# Patient Record
Sex: MALE | Race: WHITE | NOT HISPANIC OR LATINO
[De-identification: names, ages, dates, MRNs, and addresses within clinical notes are randomized per-mention and may not be internally consistent; named-entity substitution may affect disease eponyms.]

---
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
## 2020-08-18 ENCOUNTER — APPOINTMENT (OUTPATIENT)
Dept: PEDIATRICS | Facility: CLINIC | Age: 20
End: 2020-08-18
Payer: COMMERCIAL

## 2020-08-18 VITALS
BODY MASS INDEX: 24.36 KG/M2 | HEART RATE: 75 BPM | OXYGEN SATURATION: 98 % | WEIGHT: 174 LBS | SYSTOLIC BLOOD PRESSURE: 126 MMHG | DIASTOLIC BLOOD PRESSURE: 80 MMHG | HEIGHT: 71 IN

## 2020-08-18 DIAGNOSIS — E55.9 VITAMIN D DEFICIENCY, UNSPECIFIED: ICD-10-CM

## 2020-08-18 PROCEDURE — 96127 BRIEF EMOTIONAL/BEHAV ASSMT: CPT

## 2020-08-18 PROCEDURE — 90471 IMMUNIZATION ADMIN: CPT

## 2020-08-18 PROCEDURE — 90651 9VHPV VACCINE 2/3 DOSE IM: CPT

## 2020-08-18 PROCEDURE — 96160 PT-FOCUSED HLTH RISK ASSMT: CPT | Mod: 59

## 2020-08-18 PROCEDURE — 90621 MENB-FHBP VACC 2/3 DOSE IM: CPT

## 2020-08-18 PROCEDURE — 99395 PREV VISIT EST AGE 18-39: CPT | Mod: 25

## 2020-08-18 PROCEDURE — 90472 IMMUNIZATION ADMIN EACH ADD: CPT

## 2020-08-18 NOTE — PHYSICAL EXAM
[Alert] : alert [No Acute Distress] : no acute distress [Normocephalic] : normocephalic [EOMI Bilateral] : EOMI bilateral [Clear tympanic membranes with bony landmarks and light reflex present bilaterally] : clear tympanic membranes with bony landmarks and light reflex present bilaterally  [Nonerythematous Oropharynx] : nonerythematous oropharynx [Pink Nasal Mucosa] : pink nasal mucosa [Supple, full passive range of motion] : supple, full passive range of motion [No Palpable Masses] : no palpable masses [Clear to Auscultation Bilaterally] : clear to auscultation bilaterally [Regular Rate and Rhythm] : regular rate and rhythm [No Murmurs] : no murmurs [Normal S1, S2 audible] : normal S1, S2 audible [+2 Femoral Pulses] : +2 femoral pulses [Soft] : soft [NonTender] : non tender [Non Distended] : non distended [Normoactive Bowel Sounds] : normoactive bowel sounds [No Splenomegaly] : no splenomegaly [No Hepatomegaly] : no hepatomegaly [No Abnormal Lymph Nodes Palpated] : no abnormal lymph nodes palpated [Normal Muscle Tone] : normal muscle tone [No pain or deformities with palpation of bone, muscles, joints] : no pain or deformities with palpation of bone, muscles, joints [No Gait Asymmetry] : no gait asymmetry [Straight] : straight [+2 Patella DTR] : +2 patella DTR [Cranial Nerves Grossly Intact] : cranial nerves grossly intact [No Rash or Lesions] : no rash or lesions

## 2020-08-18 NOTE — HISTORY OF PRESENT ILLNESS
[Needs Immunizations] : needs immunizations [Eats meals with family] : eats meals with family [Has family members/adults to turn to for help] : has family members/adults to turn to for help [Is permitted and is able to make independent decisions] : Is permitted and is able to make independent decisions [Grade: ____] : Grade: [unfilled] [Normal Behavior/Attention] : normal behavior/attention [Normal Performance] : normal performance [Normal Homework] : normal homework [Eats regular meals including adequate fruits and vegetables] : eats regular meals including adequate fruits and vegetables [Calcium source] : calcium source [Drinks non-sweetened liquids] : drinks non-sweetened liquids  [Has friends] : has friends [At least 1 hour of physical activity a day] : at least 1 hour of physical activity a day [Has interests/participates in community activities/volunteers] : has interests/participates in community activities/volunteers. [Drinks alcohol] : drinks alcohol [No] : No cigarette smoke exposure [Uses safety belts/safety equipment] : uses safety belts/safety equipment  [Has peer relationships free of violence] : has peer relationships free of violence [Yes] : Patient has had sexual intercourse. [Always] : Condom use: always [Gender Identification: _______] : [unfilled] [Sexual Orientation: _______] : [unfilled] [HIV Screening Declined] : HIV Screening Declined [Has ways to cope with stress] : has ways to cope with stress [Displays self-confidence] : displays self-confidence [With Teen] : teen [Sleep Concerns] : no sleep concerns [Has concerns about body or appearance] : does not have concerns about body or appearance [Screen time (except homework) less than 2 hours a day] : no screen time (except homework) less than 2 hours a day [Exposure to electronic nicotine delivery system] : no exposure to electronic nicotine delivery system [Uses electronic nicotine delivery system] : does not use electronic nicotine delivery system [Uses tobacco] : does not use tobacco [Exposure to tobacco] : no exposure to tobacco [Exposure to drugs] : no exposure to drugs [Uses drugs] : does not use drugs  [Impaired/distracted driving] : no impaired/distracted driving [Exposure to alcohol] : no exposure to alcohol [History of STI] : no history of STI [Has problems with sleep] : does not have problems with sleep [Gets depressed, anxious, or irritable/has mood swings] : does not get depressed, anxious, or irritable/has mood swings [Has thought about hurting self or considered suicide] : has not thought about hurting self or considered suicide [FreeTextEntry7] : No hospitalization/Surgeries, Specialist visit. [de-identified] : None [de-identified] : HPV#3/Men B#2

## 2021-01-19 ENCOUNTER — APPOINTMENT (OUTPATIENT)
Dept: PEDIATRICS | Facility: CLINIC | Age: 21
End: 2021-01-19
Payer: COMMERCIAL

## 2021-01-19 VITALS
HEART RATE: 64 BPM | SYSTOLIC BLOOD PRESSURE: 145 MMHG | BODY MASS INDEX: 25.2 KG/M2 | HEIGHT: 71 IN | TEMPERATURE: 98.4 F | WEIGHT: 180 LBS | RESPIRATION RATE: 18 BRPM | DIASTOLIC BLOOD PRESSURE: 85 MMHG

## 2021-01-19 PROCEDURE — 99214 OFFICE O/P EST MOD 30 MIN: CPT

## 2021-01-19 PROCEDURE — 99072 ADDL SUPL MATRL&STAF TM PHE: CPT

## 2021-08-01 DIAGNOSIS — Z01.818 ENCOUNTER FOR OTHER PREPROCEDURAL EXAMINATION: ICD-10-CM

## 2021-08-01 DIAGNOSIS — Z23 ENCOUNTER FOR IMMUNIZATION: ICD-10-CM

## 2021-08-01 DIAGNOSIS — Z00.129 ENCOUNTER FOR ROUTINE CHILD HEALTH EXAMINATION W/OUT ABNORMAL FINDINGS: ICD-10-CM

## 2021-08-02 ENCOUNTER — APPOINTMENT (OUTPATIENT)
Dept: PEDIATRICS | Facility: CLINIC | Age: 21
End: 2021-08-02
Payer: COMMERCIAL

## 2021-08-02 ENCOUNTER — LABORATORY RESULT (OUTPATIENT)
Age: 21
End: 2021-08-02

## 2021-08-02 VITALS — WEIGHT: 176.13 LBS | TEMPERATURE: 98.2 F | HEART RATE: 82 BPM | HEIGHT: 71 IN | BODY MASS INDEX: 24.66 KG/M2

## 2021-08-02 DIAGNOSIS — Z00.00 ENCOUNTER FOR GENERAL ADULT MEDICAL EXAMINATION W/OUT ABNORMAL FINDINGS: ICD-10-CM

## 2021-08-02 PROCEDURE — 99408 AUDIT/DAST 15-30 MIN: CPT

## 2021-08-02 PROCEDURE — 99395 PREV VISIT EST AGE 18-39: CPT

## 2021-08-02 PROCEDURE — 96127 BRIEF EMOTIONAL/BEHAV ASSMT: CPT

## 2021-08-02 NOTE — PHYSICAL EXAM
[Alert] : alert [No Acute Distress] : no acute distress [Normocephalic] : normocephalic [EOMI Bilateral] : EOMI bilateral [Clear tympanic membranes with bony landmarks and light reflex present bilaterally] : clear tympanic membranes with bony landmarks and light reflex present bilaterally  [Pink Nasal Mucosa] : pink nasal mucosa [Nonerythematous Oropharynx] : nonerythematous oropharynx [Supple, full passive range of motion] : supple, full passive range of motion [No Palpable Masses] : no palpable masses [Clear to Auscultation Bilaterally] : clear to auscultation bilaterally [Regular Rate and Rhythm] : regular rate and rhythm [Normal S1, S2 audible] : normal S1, S2 audible [No Murmurs] : no murmurs [+2 Femoral Pulses] : +2 femoral pulses [Soft] : soft [NonTender] : non tender [Non Distended] : non distended [Normoactive Bowel Sounds] : normoactive bowel sounds [No Hepatomegaly] : no hepatomegaly [No Splenomegaly] : no splenomegaly [Anthony: _____] : Anthony [unfilled] [No Abnormal Lymph Nodes Palpated] : no abnormal lymph nodes palpated [Normal Muscle Tone] : normal muscle tone [No Gait Asymmetry] : no gait asymmetry [No pain or deformities with palpation of bone, muscles, joints] : no pain or deformities with palpation of bone, muscles, joints [Straight] : straight [+2 Patella DTR] : +2 patella DTR [Cranial Nerves Grossly Intact] : cranial nerves grossly intact [de-identified] : Surgical scar over left clavicle area

## 2021-08-02 NOTE — DISCUSSION/SUMMARY
[FreeTextEntry1] : 19 yo well male here for annual physical.\par \par PHQ-9 passed.  SEBASTIAN reviewed.\par \par 15 min Discussion regarding dangers of alcohol, (DWI, impaired judgement), vapeing/smoking, drugs and sexual activity- we discussed how these can effect his  emotionally, physically and with his education-we discussed ways to deal with peer pressure and safe sex-seemed to understand.\par

## 2021-08-02 NOTE — HISTORY OF PRESENT ILLNESS
[Up to date] : Up to date [Eats meals with family] : eats meals with family [Has family members/adults to turn to for help] : has family members/adults to turn to for help [Is permitted and is able to make independent decisions] : Is permitted and is able to make independent decisions [Grade: ____] : Grade: [unfilled] [Normal Performance] : normal performance [Normal Behavior/Attention] : normal behavior/attention [Normal Homework] : normal homework [Eats regular meals including adequate fruits and vegetables] : eats regular meals including adequate fruits and vegetables [Drinks non-sweetened liquids] : drinks non-sweetened liquids  [Calcium source] : calcium source [Has friends] : has friends [Has interests/participates in community activities/volunteers] : has interests/participates in community activities/volunteers. [Drinks alcohol] : drinks alcohol [Exposure to alcohol] : exposure to alcohol [No] : No cigarette smoke exposure [Uses safety belts/safety equipment] : uses safety belts/safety equipment  [Has peer relationships free of violence] : has peer relationships free of violence [Yes] : Patient has had sexual intercourse. [Has ways to cope with stress] : has ways to cope with stress [Displays self-confidence] : displays self-confidence [At least 1 hour of physical activity a day] : at least 1 hour of physical activity a day [Exposure to electronic nicotine delivery system] : exposure to electronic nicotine delivery system [Exposure to tobacco] : exposure to tobacco [Exposure to drugs] : exposure to drugs [Sleep Concerns] : no sleep concerns [Has concerns about body or appearance] : does not have concerns about body or appearance [Screen time (except homework) less than 2 hours a day] : no screen time (except homework) less than 2 hours a day [Uses electronic nicotine delivery system] : does not use electronic nicotine delivery system [Uses tobacco] : does not use tobacco [Uses drugs] : does not use drugs  [Impaired/distracted driving] : no impaired/distracted driving [Has problems with sleep] : does not have problems with sleep [Gets depressed, anxious, or irritable/has mood swings] : does not get depressed, anxious, or irritable/has mood swings [Has thought about hurting self or considered suicide] : has not thought about hurting self or considered suicide [de-identified] : Sleeps 7-8 hr [de-identified] : Excellent student [de-identified] : runs 3x/wk 3-5 miles.  Screen time more than 2 hr [de-identified] : Condoms always [FreeTextEntry1] : 21 yo well male here for annual physical.

## 2021-08-05 LAB
APPEARANCE: CLEAR
BASOPHILS # BLD AUTO: 0.04 K/UL
BASOPHILS NFR BLD AUTO: 0.4 %
BILIRUBIN URINE: ABNORMAL
BLOOD URINE: NEGATIVE
CHOLEST SERPL-MCNC: 160 MG/DL
COLOR: YELLOW
EOSINOPHIL # BLD AUTO: 0.02 K/UL
EOSINOPHIL NFR BLD AUTO: 0.2 %
GLUCOSE QUALITATIVE U: NEGATIVE
HCT VFR BLD CALC: 49.7 %
HGB BLD-MCNC: 16.3 G/DL
IMM GRANULOCYTES NFR BLD AUTO: 0.3 %
KETONES URINE: ABNORMAL
LEUKOCYTE ESTERASE URINE: NEGATIVE
LYMPHOCYTES # BLD AUTO: 1.42 K/UL
LYMPHOCYTES NFR BLD AUTO: 15.8 %
MAN DIFF?: NORMAL
MCHC RBC-ENTMCNC: 28 PG
MCHC RBC-ENTMCNC: 32.8 GM/DL
MCV RBC AUTO: 85.2 FL
MONOCYTES # BLD AUTO: 0.4 K/UL
MONOCYTES NFR BLD AUTO: 4.4 %
NEUTROPHILS # BLD AUTO: 7.08 K/UL
NEUTROPHILS NFR BLD AUTO: 78.9 %
NITRITE URINE: NEGATIVE
PH URINE: 6
PLATELET # BLD AUTO: 261 K/UL
PROTEIN URINE: ABNORMAL
RBC # BLD: 5.83 M/UL
RBC # FLD: 12.2 %
SPECIFIC GRAVITY URINE: 1.04
UROBILINOGEN URINE: ABNORMAL
WBC # FLD AUTO: 8.99 K/UL

## 2024-12-02 ENCOUNTER — NON-APPOINTMENT (OUTPATIENT)
Age: 24
End: 2024-12-02

## 2024-12-02 ENCOUNTER — EMERGENCY (EMERGENCY)
Facility: HOSPITAL | Age: 24
LOS: 1 days | Discharge: HOME | End: 2024-12-02
Attending: EMERGENCY MEDICINE | Admitting: EMERGENCY MEDICINE
Payer: COMMERCIAL

## 2024-12-02 VITALS
RESPIRATION RATE: 18 BRPM | TEMPERATURE: 99 F | SYSTOLIC BLOOD PRESSURE: 142 MMHG | HEIGHT: 71 IN | WEIGHT: 195.11 LBS | OXYGEN SATURATION: 95 % | DIASTOLIC BLOOD PRESSURE: 87 MMHG | HEART RATE: 78 BPM

## 2024-12-02 LAB
APPEARANCE UR: ABNORMAL
BACTERIA # UR AUTO: NEGATIVE /HPF — SIGNIFICANT CHANGE UP
BILIRUB UR-MCNC: ABNORMAL
COLOR SPEC: SIGNIFICANT CHANGE UP
COMMENT - URINE: SIGNIFICANT CHANGE UP
DIFF PNL FLD: ABNORMAL
EPI CELLS # UR: PRESENT
GLUCOSE UR QL: NEGATIVE MG/DL — SIGNIFICANT CHANGE UP
KETONES UR-MCNC: ABNORMAL MG/DL
LEUKOCYTE ESTERASE UR-ACNC: NEGATIVE — SIGNIFICANT CHANGE UP
NITRITE UR-MCNC: NEGATIVE — SIGNIFICANT CHANGE UP
PH UR: 5.5 — SIGNIFICANT CHANGE UP (ref 5–8)
PROT UR-MCNC: 100 MG/DL
RBC CASTS # UR COMP ASSIST: 5 /HPF — HIGH (ref 0–4)
SP GR SPEC: 1.03 — SIGNIFICANT CHANGE UP (ref 1–1.03)
UROBILINOGEN FLD QL: 1 MG/DL — SIGNIFICANT CHANGE UP (ref 0.2–1)
WBC UR QL: 3 /HPF — SIGNIFICANT CHANGE UP (ref 0–5)

## 2024-12-02 PROCEDURE — 81001 URINALYSIS AUTO W/SCOPE: CPT

## 2024-12-02 PROCEDURE — 96376 TX/PRO/DX INJ SAME DRUG ADON: CPT

## 2024-12-02 PROCEDURE — 74176 CT ABD & PELVIS W/O CONTRAST: CPT | Mod: 26,MC

## 2024-12-02 PROCEDURE — 96374 THER/PROPH/DIAG INJ IV PUSH: CPT

## 2024-12-02 PROCEDURE — 99284 EMERGENCY DEPT VISIT MOD MDM: CPT | Mod: 25

## 2024-12-02 PROCEDURE — 96375 TX/PRO/DX INJ NEW DRUG ADDON: CPT

## 2024-12-02 PROCEDURE — 96361 HYDRATE IV INFUSION ADD-ON: CPT

## 2024-12-02 PROCEDURE — 99284 EMERGENCY DEPT VISIT MOD MDM: CPT

## 2024-12-02 PROCEDURE — 74176 CT ABD & PELVIS W/O CONTRAST: CPT | Mod: MC

## 2024-12-02 RX ORDER — KETOROLAC TROMETHAMINE 30 MG/ML
15 INJECTION INTRAMUSCULAR; INTRAVENOUS ONCE
Refills: 0 | Status: DISCONTINUED | OUTPATIENT
Start: 2024-12-02 | End: 2024-12-02

## 2024-12-02 RX ORDER — SODIUM CHLORIDE 9 MG/ML
1000 INJECTION, SOLUTION INTRAMUSCULAR; INTRAVENOUS; SUBCUTANEOUS ONCE
Refills: 0 | Status: COMPLETED | OUTPATIENT
Start: 2024-12-02 | End: 2024-12-02

## 2024-12-02 RX ORDER — KETOROLAC TROMETHAMINE 30 MG/ML
1 INJECTION INTRAMUSCULAR; INTRAVENOUS
Qty: 20 | Refills: 0
Start: 2024-12-02 | End: 2024-12-06

## 2024-12-02 RX ORDER — TAMSULOSIN HYDROCHLORIDE 0.4 MG/1
1 CAPSULE ORAL
Qty: 7 | Refills: 0
Start: 2024-12-02 | End: 2024-12-08

## 2024-12-02 RX ORDER — ONDANSETRON HYDROCHLORIDE 4 MG/1
1 TABLET, FILM COATED ORAL
Qty: 1 | Refills: 0
Start: 2024-12-02 | End: 2024-12-04

## 2024-12-02 RX ORDER — ONDANSETRON HYDROCHLORIDE 4 MG/1
4 TABLET, FILM COATED ORAL ONCE
Refills: 0 | Status: COMPLETED | OUTPATIENT
Start: 2024-12-02 | End: 2024-12-02

## 2024-12-02 RX ADMIN — KETOROLAC TROMETHAMINE 15 MILLIGRAM(S): 30 INJECTION INTRAMUSCULAR; INTRAVENOUS at 07:27

## 2024-12-02 RX ADMIN — SODIUM CHLORIDE 1000 MILLILITER(S): 9 INJECTION, SOLUTION INTRAMUSCULAR; INTRAVENOUS; SUBCUTANEOUS at 06:00

## 2024-12-02 RX ADMIN — SODIUM CHLORIDE 1000 MILLILITER(S): 9 INJECTION, SOLUTION INTRAMUSCULAR; INTRAVENOUS; SUBCUTANEOUS at 07:00

## 2024-12-02 RX ADMIN — ONDANSETRON HYDROCHLORIDE 4 MILLIGRAM(S): 4 TABLET, FILM COATED ORAL at 06:00

## 2024-12-02 RX ADMIN — ONDANSETRON HYDROCHLORIDE 4 MILLIGRAM(S): 4 TABLET, FILM COATED ORAL at 07:35

## 2024-12-02 NOTE — ED ADULT NURSE NOTE - OBJECTIVE STATEMENT
Pt is alert, came to the ER due to left flank pain from 3 hours ago with nausea and vomiting x 1. History of Kidney stones in the past. No fever.

## 2024-12-02 NOTE — ED PROVIDER NOTE - PROGRESS NOTE DETAILS
Patient CT scan did demonstrate a 5 mm UPJ stone.  He did have episodes of pain here in the emergency department but he also had 1 episode of emesis that required treatment with Zofran.  I am still awaiting the patient's urinalysis to ensure that there are no signs of infection we will have a low suspicion.  His pain is well-controlled here in the emergency department.  If his urinalysis negative he can be discharged and follow-up with his urologist.

## 2024-12-02 NOTE — ED PROVIDER NOTE - OBJECTIVE STATEMENT
23-year-old male past medical history of kidney stones pending emerged from today with few hours of left-sided flank pain.  Has been colicky in nature.  Currently no pain.  Is been drinking a lot of water attempted to urinate with only a small amount of output so prompting his emergency department visit.  He had 2 stones in the past none of which required intervention.  He is nauseous with no emesis.  There is no fever

## 2024-12-02 NOTE — ED PROVIDER NOTE - PHYSICAL EXAMINATION
Vitals: I have reviewed the patients vital signs  General: nontoxic appearing  HEENT: Atraumatic, normocephalic, airway patent  Eyes: EOMI, tracking appropriately  Neck: no tracheal deviation  Chest/Lungs: no trauma, symmetric chest rise, speaking in complete sentences,  no resp distress  Heart: skin and extremities well perfused, regular rate and rhythm  Neuro: A+Ox3, appears non focal  MSK: no deformities  Skin: no cyanosis, no jaundice   Psych:  Normal mood and affect  Abdomen: Soft nontender nondistended  : Mild left CVA tenderness to palpation

## 2024-12-02 NOTE — ED PROVIDER NOTE - CLINICAL SUMMARY MEDICAL DECISION MAKING FREE TEXT BOX
23-year-old male past med history of kidney stones presenting with left flank pain.  Differential includes but is not limited to kidney stone, pyelonephritis, diverticulitis.  Because the patient is concerned with the fact that he is only had minimal urine output and has not had a stone that he can reliably report we will CT him at this time.  Patient dates that he did have a 5 mm stone that was in the renal pelvis but that should not cause him pain.  I will treat him symptomatically and reassess after his laboratory studies and CT scan 23-year-old male past med history of kidney stones presenting with left flank pain.  Differential includes but is not limited to kidney stone, pyelonephritis, diverticulitis.  Because the patient is concerned with the fact that he is only had minimal urine output and has not had a stone that he can reliably report we will CT him at this time.  Patient dates that he did have a 5 mm stone that was in the renal pelvis but that should not cause him pain.  I will treat him symptomatically and reassess after his laboratory studies and CT scan    Cayden - received s/o from Dr Chen. Pt found to have UPJ stone. Pending UA results. Pt with mild pain/nausea.

## 2024-12-02 NOTE — ED PROVIDER NOTE - PATIENT PORTAL LINK FT
You can access the FollowMyHealth Patient Portal offered by [facility] by registering at the following website: [email or web address]. By joining [employer]’s FollowMyHealth portal, you will also be able to view your health information using other applications (apps) compatible with our system.

## 2024-12-03 ENCOUNTER — APPOINTMENT (OUTPATIENT)
Dept: UROLOGY | Facility: CLINIC | Age: 24
End: 2024-12-03
Payer: COMMERCIAL

## 2024-12-03 VITALS
HEART RATE: 85 BPM | HEIGHT: 71 IN | DIASTOLIC BLOOD PRESSURE: 80 MMHG | WEIGHT: 195 LBS | BODY MASS INDEX: 27.3 KG/M2 | OXYGEN SATURATION: 98 % | SYSTOLIC BLOOD PRESSURE: 130 MMHG

## 2024-12-03 DIAGNOSIS — N20.0 CALCULUS OF KIDNEY: ICD-10-CM

## 2024-12-03 PROCEDURE — 99204 OFFICE O/P NEW MOD 45 MIN: CPT

## 2024-12-03 RX ORDER — ONDANSETRON 4 MG/1
4 TABLET ORAL EVERY 8 HOURS
Qty: 30 | Refills: 0 | Status: ACTIVE | COMMUNITY
Start: 2024-12-03 | End: 1900-01-01

## 2024-12-03 RX ORDER — TAMSULOSIN HYDROCHLORIDE 0.4 MG/1
0.4 CAPSULE ORAL
Refills: 0 | Status: ACTIVE | COMMUNITY

## 2024-12-03 RX ORDER — TAMSULOSIN HYDROCHLORIDE 0.4 MG/1
0.4 CAPSULE ORAL
Qty: 30 | Refills: 6 | Status: ACTIVE | COMMUNITY
Start: 2024-12-03 | End: 1900-01-01

## 2024-12-04 ENCOUNTER — TRANSCRIPTION ENCOUNTER (OUTPATIENT)
Age: 24
End: 2024-12-04

## 2024-12-13 ENCOUNTER — APPOINTMENT (OUTPATIENT)
Dept: ULTRASOUND IMAGING | Facility: CLINIC | Age: 24
End: 2024-12-13
Payer: COMMERCIAL

## 2024-12-13 ENCOUNTER — OUTPATIENT (OUTPATIENT)
Dept: OUTPATIENT SERVICES | Facility: HOSPITAL | Age: 24
LOS: 1 days | End: 2024-12-13
Payer: COMMERCIAL

## 2024-12-13 DIAGNOSIS — N20.0 CALCULUS OF KIDNEY: ICD-10-CM

## 2024-12-13 PROCEDURE — 76770 US EXAM ABDO BACK WALL COMP: CPT | Mod: 26

## 2024-12-13 PROCEDURE — 76770 US EXAM ABDO BACK WALL COMP: CPT

## 2024-12-17 ENCOUNTER — APPOINTMENT (OUTPATIENT)
Dept: UROLOGY | Facility: CLINIC | Age: 24
End: 2024-12-17

## 2025-01-02 ENCOUNTER — OUTPATIENT (OUTPATIENT)
Dept: OUTPATIENT SERVICES | Facility: HOSPITAL | Age: 25
LOS: 1 days | End: 2025-01-02
Payer: COMMERCIAL

## 2025-01-02 VITALS
TEMPERATURE: 97 F | DIASTOLIC BLOOD PRESSURE: 79 MMHG | WEIGHT: 195.11 LBS | HEART RATE: 71 BPM | RESPIRATION RATE: 14 BRPM | HEIGHT: 71 IN | SYSTOLIC BLOOD PRESSURE: 131 MMHG | OXYGEN SATURATION: 98 %

## 2025-01-02 DIAGNOSIS — Z01.818 ENCOUNTER FOR OTHER PREPROCEDURAL EXAMINATION: ICD-10-CM

## 2025-01-02 DIAGNOSIS — N20.0 CALCULUS OF KIDNEY: ICD-10-CM

## 2025-01-02 DIAGNOSIS — Z87.81 PERSONAL HISTORY OF (HEALED) TRAUMATIC FRACTURE: Chronic | ICD-10-CM

## 2025-01-02 LAB
APPEARANCE UR: CLEAR — SIGNIFICANT CHANGE UP
BILIRUB UR-MCNC: NEGATIVE — SIGNIFICANT CHANGE UP
COLOR SPEC: YELLOW — SIGNIFICANT CHANGE UP
DIFF PNL FLD: NEGATIVE — SIGNIFICANT CHANGE UP
GLUCOSE UR QL: NEGATIVE MG/DL — SIGNIFICANT CHANGE UP
HCT VFR BLD CALC: 43.3 % — SIGNIFICANT CHANGE UP (ref 39–50)
HGB BLD-MCNC: 14.9 G/DL — SIGNIFICANT CHANGE UP (ref 13–17)
KETONES UR-MCNC: NEGATIVE MG/DL — SIGNIFICANT CHANGE UP
LEUKOCYTE ESTERASE UR-ACNC: NEGATIVE — SIGNIFICANT CHANGE UP
MCHC RBC-ENTMCNC: 28.7 PG — SIGNIFICANT CHANGE UP (ref 27–34)
MCHC RBC-ENTMCNC: 34.4 G/DL — SIGNIFICANT CHANGE UP (ref 32–36)
MCV RBC AUTO: 83.4 FL — SIGNIFICANT CHANGE UP (ref 80–100)
NITRITE UR-MCNC: NEGATIVE — SIGNIFICANT CHANGE UP
NRBC # BLD: 0 /100 WBCS — SIGNIFICANT CHANGE UP (ref 0–0)
PH UR: 6 — SIGNIFICANT CHANGE UP (ref 5–8)
PLATELET # BLD AUTO: 203 K/UL — SIGNIFICANT CHANGE UP (ref 150–400)
PROT UR-MCNC: NEGATIVE MG/DL — SIGNIFICANT CHANGE UP
RBC # BLD: 5.19 M/UL — SIGNIFICANT CHANGE UP (ref 4.2–5.8)
RBC # FLD: 11.8 % — SIGNIFICANT CHANGE UP (ref 10.3–14.5)
SP GR SPEC: 1.01 — SIGNIFICANT CHANGE UP (ref 1–1.03)
UROBILINOGEN FLD QL: 0.2 MG/DL — SIGNIFICANT CHANGE UP (ref 0.2–1)
WBC # BLD: 5.85 K/UL — SIGNIFICANT CHANGE UP (ref 3.8–10.5)
WBC # FLD AUTO: 5.85 K/UL — SIGNIFICANT CHANGE UP (ref 3.8–10.5)

## 2025-01-02 PROCEDURE — 87086 URINE CULTURE/COLONY COUNT: CPT

## 2025-01-02 PROCEDURE — 81003 URINALYSIS AUTO W/O SCOPE: CPT

## 2025-01-02 PROCEDURE — 36415 COLL VENOUS BLD VENIPUNCTURE: CPT

## 2025-01-02 PROCEDURE — 85027 COMPLETE CBC AUTOMATED: CPT

## 2025-01-02 PROCEDURE — G0463: CPT

## 2025-01-02 NOTE — H&P PST ADULT - ASSESSMENT
24 year old male PMH Vitamin D Insufficiency,  H/O Hearing Loss ; wears bilateral hearing aids, H/O Left Clavicle Fracture (2021 followed by surgery); now with Calculus Of Kidney presents today for PST prior to Cystoscopy-Laser Lithotripsy - LEFT ureteral Stent Placement with Dr De León on 1/9/2025.    24 year old male PMH Vitamin D Insufficiency,  H/O Hearing Loss ; wears bilateral hearing aids, H/O kidney Stone ( no surgical intervention), H/O Left Clavicle Fracture (2021 followed by surgery); now with Calculus Of Kidney presents today for PST prior to Cystoscopy-Laser Lithotripsy - LEFT ureteral Stent Placement with Dr De León on 1/9/2025.

## 2025-01-02 NOTE — H&P PST ADULT - PAIN SITE
80 yo male brought into the ER by EMS for evaluation of right hip pain  s/p mechanical fall this evening. Pt states "I was doing leg raises when I lost my balance and fell and landed on my hip. I was not able to get up or move without a great deal of pain. I hit my head as well when I fell and I take Xarelto for AFIB. Patient seen today. Pain well controlled. Patient working with physical therapy. S/P Right hip IM nail ORIF on 10/31/19. Right leg with 4+ edema consistent with loss of blood on Xarelto. I asked the nurse to apply ice around the clock. To repeat CBC and transfuse  if necessary.      MEDICATIONS  (STANDING):  lactated ringers. 1000 milliLiter(s) (75 mL/Hr) IV Continuous <Continuous>  rivaroxaban 20 milliGRAM(s) Oral with dinner  senna 2 Tablet(s) Oral at bedtime    MEDICATIONS  (PRN):  acetaminophen   Tablet .. 975 milliGRAM(s) Oral every 8 hours PRN Mild Pain (1 - 3)  bisacodyl Suppository 10 milliGRAM(s) Rectal daily PRN If no bowel movement  magnesium hydroxide Suspension 30 milliLiter(s) Oral daily PRN Constipation  traMADol 25 milliGRAM(s) Oral every 8 hours PRN Moderate Pain (4 - 6)  traMADol 50 milliGRAM(s) Oral every 8 hours PRN Severe Pain (7 - 10)          Vital Signs Last 24 Hrs  T(C): 37.2 (05 Nov 2019 06:06), Max: 37.5 (05 Nov 2019 00:43)  T(F): 99 (05 Nov 2019 06:06), Max: 99.5 (05 Nov 2019 00:43)  HR: 83 (05 Nov 2019 06:06) (80 - 85)  BP: 121/76 (05 Nov 2019 06:06) (100/67 - 121/76)  BP(mean): --  RR: 18 (05 Nov 2019 06:06) (18 - 18)  SpO2: 94% (05 Nov 2019 06:06) (94% - 100%)    PHYSICAL EXAM:  GENERAL: NAD, well nourished and conversant  HEAD:  Atraumatic  EYES: EOM, PERRLA, conjunctiva pink and sclera white  ENT: No tonsillar erythema, exudates, or enlargement, moist mucous membranes, good dentition, no lesions  NECK: Supple, No JVD, normal thyroid, carotids with normal upstrokes and no bruits  CHEST/LUNG: Clear to auscultation bilaterally, No rales, rhonchi, wheezing, or rubs  HEART: Regular rate and rhythm, No murmurs, rubs, or gallops  ABDOMEN: Soft, nondistended, no masses, guarding, tenderness or rebound, bowel sounds present  EXTREMITIES:  2+ Peripheral Pulses, No clubbing, cyanosis, or edema. s/p re[aojf pf ;ef  LYMPH: No lymphadenopathy noted  SKIN: No rashes or lesions  NERVOUS SYSTEM:  Alert & Oriented X3, normal cognitive function. Motor Strength 5/5 right upper and right lower.  5/5 left upper and left lower extremities, DTRs 2+ intact and symmetric    LABS: No labs obtained today                        CAPILLARY BLOOD GLUCOSE          RADIOLOGY & ADDITIONAL TESTS: Denies any pain today while here in PST

## 2025-01-02 NOTE — H&P PST ADULT - NS PRO FEM  PAP SMEARS 3YRS
[employer] accepted flight with an 18 min ETA.     Tripp Nieves  09/19/19 1907     not applicable (Male)

## 2025-01-02 NOTE — H&P PST ADULT - DIAGNOSTICS OTHER REVIEWED
ROM, Balance Training, Functional Mobility Training, Endurance Training, Pain Management, Self-Care / ADL, Safety Education & Training, Patient/Caregiver Education & Training    G-Code  OT G-codes  Score: 16  CK am pac adl  Functional Limitation: Self care  Self Care Current Status (): At least 40 percent but less than 60 percent impaired, limited or restricted  Self Care Goal Status (): At least 20 percent but less than 40 percent impaired, limited or restricted     OutComes Score  How much help for putting on and taking off regular lower body clothing?: A Lot  How much help for Bathing?: A Lot  How much help for Toileting?: A Lot  How much help for putting on and taking off regular upper body clothing?: A Lot  How much help for taking care of personal grooming?: None  How much help for eating meals?: None  AM-PAC Inpatient Daily Activity Raw Score: 16  AM-PAC Inpatient ADL T-Scale Score : 35.96  ADL Inpatient CMS 0-100% Score: 53.32  ADL Inpatient CMS G-Code Modifier : CK  AM-PAC Score    Ahsan Garcia scored a 16/24 on the AM-[facility] ADL Inpatient form. Current research shows that an AM-PAC score of 17 or less is typically not associated with a discharge to the patient's home setting. Based on the patients AM-PAC score and their current ADL deficits, it is recommended that the patient have 3-5 sessions per week of Occupational Therapy at d/c to increase the patients independence. AM-PAC Inpatient Daily Activity Raw Score: 16  AM-PAC Inpatient ADL T-Scale Score : 35.96  ADL Inpatient CMS 0-100% Score: 53.32  ADL Inpatient CMS G-Code Modifier : CK    Goals  Short term goals  Time Frame for Short term goals: By D/C:  Short term goal 1: SBA- CGA bed mobility  Short term goal 2: Min A ADL transfer /mobility  Short term goal 3: Min A bathing M  Short term goal 4: Min a dressing  Short term goal 5: Pt will participate in  L UE theraputic ex to regain functional AROM for ADLs.   Long term goals  Time Frame for Long term goals : tbd NEXT LEVEL CARE  Patient Goals   Patient goals : pt plans to continue therapies on SNU at D/C- no other specifics stated       Therapy Time   Individual Concurrent Group Co-treatment   Time In 1740         Time Out 1845         Minutes 65              If patient is discharged prior to next OT visit , refer to last OT progress note for Discharge Status.     Musa Brown, OT  LIC # [id number] No

## 2025-01-02 NOTE — H&P PST ADULT - NSICDXPASTMEDICALHX_GEN_ALL_CORE_FT
PAST MEDICAL HISTORY:  Calculus of kidney     Fracture of left clavicle     H/O hearing loss     History of use of hearing aid in both ears     Vitamin D insufficiency      PAST MEDICAL HISTORY:  Calculus of kidney     Fracture of left clavicle     H/O hearing loss     History of use of hearing aid in both ears     Kidney stone     Vitamin D insufficiency

## 2025-01-02 NOTE — H&P PST ADULT - HISTORY OF PRESENT ILLNESS
24 year old male PMH Vitamin D Insufficiency,  H/O Hearing Loss ; wears bilateral hearing aids, H/O Left Clavicle Fracture (2021 followed by surgery); now with Calculus Of Kidney presents today for PST prior to Cystoscopy-Laser Lithotripsy - LEFT ureteral Stent Placement with Dr De León on 1/9/2025.     Pt notes on 12/3/24 he went to the emergency room at [place] with complaints of left low back ( flank)  pain and difficulty urinating.  Pt notes he underwent CT scan which showed left kidney stone (8mm). Denies prior H/O kidney stones in the past.  Denies any fevers or chills, denies current visible hematuria, does note "occasional burning with urination." Pt notes he was discharged from the emergency room and instructed to follow up with Dr De León. Following consult, exam, review of DX testing and discussions  regarding treatment options pt is electing for scheduled procedure.  24 year old male PMH Vitamin D Insufficiency,  H/O Hearing Loss ; wears bilateral hearing aids, H/O Kidney Stones ( no prior surgical intervention), H/O Left Clavicle Fracture (2021 followed by surgery); now with Calculus Of Kidney presents today for PST prior to Cystoscopy-Laser Lithotripsy - LEFT ureteral Stent Placement with Dr De León on 1/9/2025.     Pt notes on 12/3/24 he went to the emergency room at [place] with complaints of left low back ( flank)  pain and difficulty urinating.  Pt notes he underwent CT scan which showed left kidney stone (8mm). Denies prior H/O kidney stones in the past.  Denies any fevers or chills, denies current visible hematuria, does note "occasional burning with urination." Pt notes he was discharged from the emergency room and instructed to follow up with Dr De León. Following consult, exam, review of DX testing and discussions  regarding treatment options pt is electing for scheduled procedure.

## 2025-01-02 NOTE — H&P PST ADULT - MENTAL STATUS
[facility] PSYCHIATRIC [place] HOSPITALIST  DISCHARGE SUMMARY     Kayleigh Tobin Patient Status:  Inpatient    1978 MRN [id number]   [facility] 5NW-A Attending Jenelle Gutiérrez MD   Hosp Day # 8 PCP Krysten Chris MD     Date of Admission: 2018 Subjective:      Patient ID: Emma Salas is a 76 y.o. male. HPI  This is a 72 yo male with h/o HTN, GERD, AFIB/Coumadin, Pacemaker, BPH w/LUTs on Flomax and prostate nodule (bx negative in ) back in follow-up. He had a Rt radical orchiectomy at [facility] for carcinoid tumor which was latter found to be metastatic after had resection of pelvis mass and LN dissection. He continues to see Dr Bushra Mcgraw and is being followed closely. I reviewed the interval PSA with the patient. He gets occasional PVD but is not that bothersome and has a good flow and no hematuria or dysuria. He has no  wt loss.  He has no abnl bone pains.      Trus//prostate biopsy 2012: neg, done for nodule and rising PSA, PV 50.9 cc  Cytology 2012: neg      Past Medical History:   Diagnosis Date    Atrial fib/flutter, transient     BPH (benign prostatic hypertrophy)     Dr Bert Cintron [facility])     Pacemaker     Personal history of colon cancer 2009    small bowel     Testicular cancer [facility])      Past Surgical History:   Procedure Laterality Date    APPENDECTOMY      CYSTOSCOPY      EYE SURGERY Right 2019    growth removed     PACEMAKER INSERTION  2012    PACEMAKER PLACEMENT      SMALL INTESTINE SURGERY      cancer     Social History     Socioeconomic History    Marital status:      Spouse name: None    Number of children: None    Years of education: None    Highest education level: None   Occupational History    None   Social Needs    Financial resource strain: None    Food insecurity     Worry: None     Inability: None    Transportation needs     Medical: None     Non-medical: None   Tobacco Use    Smoking status: Former Smoker     Packs/day: 0.25     Years: 13.00     Pack years: 3.25     Last attempt to quit: 1973     Years since quittin.8    Smokeless tobacco: Never Used   Substance and Sexual Activity    Alcohol use: Yes     Comment: rare    Drug use: None    Sexual activity: None Lifestyle    Physical activity     Days per week: None     Minutes per session: None    Stress: None   Relationships    Social connections     Talks on phone: None     Gets together: None     Attends [employer] service: None     Active member of club or organization: None     Attends meetings of clubs or organizations: None     Relationship status: None    Intimate partner violence     Fear of current or ex partner: None     Emotionally abused: None     Physically abused: None     Forced sexual activity: None   Other Topics Concern    None   Social History Narrative    None     Family History   Problem Relation Age of Onset    Heart Attack Mother     Cancer Father         abdominal cancer    Cancer Brother         thyroid cancer     Current Outpatient Medications   Medication Sig Dispense Refill    tamsulosin (FLOMAX) 0.4 MG capsule Take 1 capsule by mouth daily 90 capsule 3    warfarin (COUMADIN) 1.25 mg TABS Take 1.25 mg by mouth Indications: Mon tues thur fri      tamsulosin (FLOMAX) 0.4 MG capsule Take 1 capsule by mouth daily 90 capsule 3    acetaminophen (TYLENOL) 325 MG tablet Take 2 tablets by mouth every 4-6 hours as needed      warfarin (COUMADIN) 2.5 MG tablet Take 1 tablet by mouth daily Indications: wed, sat, sun   3    metoprolol tartrate (LOPRESSOR) 100 MG tablet 1/2 bid (Patient taking differently: Take 100 mg by mouth 2 times daily ) 60 tablet 2    aspirin 81 MG tablet Take 81 mg by mouth daily      diltiazem (CARDIZEM CD) 240 MG ER capsule Take 240 mg by mouth daily.  omeprazole (PRILOSEC OTC) 20 MG tablet Take 20 mg by mouth daily.  magnesium oxide (MAG-OX) 400 MG tablet Take 400 mg by mouth 3 times daily. No current facility-administered medications for this visit. Demerol hcl [meperidine]  reviewed    Review of Systems   Constitutional: Negative for unexpected weight change. Gastrointestinal: Negative for abdominal distention and abdominal pain.      Brief Synopsis: Patient presented with heel ulcer. He was evaluated in the ER and admitted for cellulitis. Patient continued on IV abx, then began to have a fever.  Additionally, patient with uncontrolled DM, A1c 14 a couple days prior to admission, Genitourinary: Negative for dysuria, flank pain and hematuria. Objective:   Physical Exam  Constitutional:       Appearance: Normal appearance. Genitourinary:     Prostate: Enlarged. Not tender and no nodules present. Rectum: No external hemorrhoid. Neurological:      Mental Status: He is alert. Psychiatric:         Mood and Affect: Mood normal.           PSA   Date Value Ref Range Status   10/27/2020 4.80 (H) 0.00 - 4.0 ng/mL Final   10/23/2019 4.40 (H) 0.00 - 4.0 ng/mL Final   10/23/2018 4.4 (H) 0.0 - 4.0 ng/mL Final   04/20/2018 4.2 (H) 0.0 - 4.0 ng/mL Final   04/02/2015 2.6 ng/mL Final       Assessment: This is a 70 yo male with h/o HTN, GERD, AFIB/Coumadin, Pacemaker, BPH w/LUTs on Flomax (stable) and prostate nodule (bx negative in 2012) with a relatively stable but slightly elevated PSA. He remains not currently interested in another biopsy or prostate MRI and understands the 25 % risk for prostate cancer based on the current PSA. He has metastatic carcinoid tumor (Rt radical orchiectomy) and is still getting treatment/follow-up via [facility]. Plan:      1.  F/U 1 yr for PSA        Fredrick Mortimer, MD before breakfast If on insulin, test blood glucose 3 times per day before meals  Non-Medicare:  Test blood glucose 4-5 times per day before meals, bedtime and for signs, symptoms of hypoglycemia or as ordered by physician   Quantity:  50 strip  Refills:  6 Soln  · Insulin Pen Needle 30G X 8 MM Misc         [place]    Follow-up appointment:   Roberto Barnett NP  [id number]  [phone]    Go to  2/27/2018 at 10AM    This note is linked to that written becki A & O X 3

## 2025-01-02 NOTE — H&P PST ADULT - PROBLEM SELECTOR PLAN 1
PST labs; CBC, U/A, Urine Culture. No medical clearance needed as per Dr De León/PST. Pt instructed to stop any NSAIDS/Herbal Supplements between now and procedure. May take Tylenol if needed for any pain between now and procedure. Morning of procedure pt may take his Tamsulosin with small sip of water. Pre-op instructions given to pt with understanding verbalized. All questions addressed with pt prior to him leaving the PST department today.

## 2025-01-03 LAB
CULTURE RESULTS: NO GROWTH — SIGNIFICANT CHANGE UP
SPECIMEN SOURCE: SIGNIFICANT CHANGE UP

## 2025-01-08 NOTE — ASU PATIENT PROFILE, ADULT - FALL HARM RISK - UNIVERSAL INTERVENTIONS
Bed in lowest position, wheels locked, appropriate side rails in place/Call bell, personal items and telephone in reach/Instruct patient to call for assistance before getting out of bed or chair/Non-slip footwear when patient is out of bed/[place] to call system/Physically safe environment - no spills, clutter or unnecessary equipment/Purposeful Proactive Rounding/Room/bathroom lighting operational, light cord in reach

## 2025-01-08 NOTE — ASU PATIENT PROFILE, ADULT - NSICDXPASTMEDICALHX_GEN_ALL_CORE_FT
PAST MEDICAL HISTORY:  Calculus of kidney     Fracture of left clavicle     H/O hearing loss     History of use of hearing aid in both ears     Kidney stone     Vitamin D insufficiency

## 2025-01-09 ENCOUNTER — TRANSCRIPTION ENCOUNTER (OUTPATIENT)
Age: 25
End: 2025-01-09

## 2025-01-09 ENCOUNTER — OUTPATIENT (OUTPATIENT)
Dept: OUTPATIENT SERVICES | Facility: HOSPITAL | Age: 25
LOS: 1 days | End: 2025-01-09
Payer: COMMERCIAL

## 2025-01-09 ENCOUNTER — APPOINTMENT (OUTPATIENT)
Dept: UROLOGY | Facility: HOSPITAL | Age: 25
End: 2025-01-09

## 2025-01-09 VITALS
OXYGEN SATURATION: 98 % | RESPIRATION RATE: 15 BRPM | DIASTOLIC BLOOD PRESSURE: 94 MMHG | HEART RATE: 82 BPM | SYSTOLIC BLOOD PRESSURE: 152 MMHG

## 2025-01-09 VITALS
TEMPERATURE: 98 F | WEIGHT: 194.01 LBS | SYSTOLIC BLOOD PRESSURE: 146 MMHG | RESPIRATION RATE: 18 BRPM | HEIGHT: 71 IN | OXYGEN SATURATION: 98 % | DIASTOLIC BLOOD PRESSURE: 95 MMHG | HEART RATE: 98 BPM

## 2025-01-09 DIAGNOSIS — Z87.81 PERSONAL HISTORY OF (HEALED) TRAUMATIC FRACTURE: Chronic | ICD-10-CM

## 2025-01-09 DIAGNOSIS — N20.0 CALCULUS OF KIDNEY: ICD-10-CM

## 2025-01-09 PROCEDURE — 52344 CYSTO/URETERO STRICTURE TX: CPT | Mod: LT

## 2025-01-09 PROCEDURE — 74420 UROGRAPHY RTRGR +-KUB: CPT | Mod: 26

## 2025-01-09 PROCEDURE — C1758: CPT

## 2025-01-09 PROCEDURE — C1769: CPT

## 2025-01-09 PROCEDURE — 76000 FLUOROSCOPY <1 HR PHYS/QHP: CPT

## 2025-01-09 PROCEDURE — 52332 CYSTOSCOPY AND TREATMENT: CPT | Mod: LT

## 2025-01-09 PROCEDURE — C2617: CPT

## 2025-01-09 PROCEDURE — C1889: CPT

## 2025-01-09 DEVICE — URETERAL SHEATH NAVIGATOR 11/13FR X 46CM: Type: IMPLANTABLE DEVICE | Site: LEFT | Status: FUNCTIONAL

## 2025-01-09 DEVICE — URETERAL CATH OPEN END FLEXI-TIP 5FR .038" X 70CM: Type: IMPLANTABLE DEVICE | Site: LEFT | Status: FUNCTIONAL

## 2025-01-09 DEVICE — GUIDEWIRE SENSOR DUAL-FLEX NITINOL STRAIGHT .035" X 150CM: Type: IMPLANTABLE DEVICE | Site: LEFT | Status: FUNCTIONAL

## 2025-01-09 DEVICE — URETERAL STENT TRIA SOFT 6FR 24MM: Type: IMPLANTABLE DEVICE | Site: LEFT | Status: FUNCTIONAL

## 2025-01-09 DEVICE — URETERAL SHEATH NAVIGATOR HD 11/13FR X 36CM: Type: IMPLANTABLE DEVICE | Site: LEFT | Status: FUNCTIONAL

## 2025-01-09 DEVICE — URETERAL CATH DUAL LUMEN 10FR 54CM: Type: IMPLANTABLE DEVICE | Site: LEFT | Status: FUNCTIONAL

## 2025-01-09 RX ORDER — SULFAMETHOXAZOLE/TRIMETHOPRIM 800-160 MG
1 TABLET ORAL
Qty: 6 | Refills: 0
Start: 2025-01-09 | End: 2025-01-11

## 2025-01-09 RX ORDER — HYDROMORPHONE HCL 4 MG
0.5 TABLET ORAL
Refills: 0 | Status: DISCONTINUED | OUTPATIENT
Start: 2025-01-09 | End: 2025-01-09

## 2025-01-09 RX ORDER — SODIUM CHLORIDE 9 MG/ML
1000 INJECTION, SOLUTION INTRAVENOUS
Refills: 0 | Status: DISCONTINUED | OUTPATIENT
Start: 2025-01-09 | End: 2025-01-09

## 2025-01-09 RX ORDER — TAMSULOSIN HYDROCHLORIDE 0.4 MG/1
1 CAPSULE ORAL
Qty: 7 | Refills: 0 | DISCHARGE

## 2025-01-09 RX ORDER — GENTAMICIN SULFATE 40 MG/ML
160 VIAL (ML) INJECTION ONCE
Refills: 0 | Status: COMPLETED | OUTPATIENT
Start: 2025-01-09 | End: 2025-01-09

## 2025-01-09 RX ORDER — OXYCODONE HCL 15 MG
1 TABLET ORAL
Qty: 8 | Refills: 0
Start: 2025-01-09

## 2025-01-09 RX ORDER — IBUPROFEN 200 MG
1 TABLET ORAL
Qty: 30 | Refills: 0
Start: 2025-01-09

## 2025-01-09 RX ORDER — ONDANSETRON 4 MG/1
4 TABLET ORAL ONCE
Refills: 0 | Status: COMPLETED | OUTPATIENT
Start: 2025-01-09 | End: 2025-01-09

## 2025-01-09 RX ORDER — ACETAMINOPHEN 80 MG/.8ML
2 SOLUTION/ DROPS ORAL
Qty: 60 | Refills: 0
Start: 2025-01-09

## 2025-01-09 RX ADMIN — ONDANSETRON 4 MILLIGRAM(S): 4 TABLET ORAL at 12:15

## 2025-01-09 RX ADMIN — SODIUM CHLORIDE 75 MILLILITER(S): 9 INJECTION, SOLUTION INTRAVENOUS at 08:53

## 2025-01-09 NOTE — BRIEF OPERATIVE NOTE - NSICDXBRIEFPREOP_GEN_ALL_CORE_FT
PRE-OP DIAGNOSIS:  Obstruction of left ureteropelvic junction (UPJ) due to stone 09-Jan-2025 12:07:18  Edenilson Chavarria

## 2025-01-09 NOTE — ASU DISCHARGE PLAN (ADULT/PEDIATRIC) - MODE OF TRANSPORTATION
decreased sunil/decreased velocity of limb motion/decreased step length/decreased stride length Wheelchair/Stroller

## 2025-01-09 NOTE — ASU DISCHARGE PLAN (ADULT/PEDIATRIC) - ASU DC SPECIAL INSTRUCTIONSFT
Follow up with Dr. De León in 1 week. Call the office to make follow up appointment.  For pain after surgery, take the followin. 600mg prescription ibuprofen - take 1 pill every 6 hours with food regularly for the first 3 days after surgery, then as needed  2. 500mg Extra-Strength Tylenol - take 2 pills every 6 hours regularly for the first 3 days after surgery, then as needed.  DO NOT EXCEED 4,000MG OF ACETAMINOPHEN PER DAY.  3. If you still have pain despite the ibuprofen/tylenol combination, then take the oxycodone as prescribed.  Take colace as prescribed while taking narcotic pain medication to prevent constipation.  4. Complete your 3 day course of antibiotic Follow up with Dr. De León in 1 week. Call the office to make follow up appointment.  For pain after surgery, take the followin. 600mg prescription ibuprofen - take 1 pill every 6 hours with food regularly for the first 3 days after surgery, then as needed  2. 500mg Extra-Strength Tylenol - take 2 pills every 6 hours regularly for the first 3 days after surgery, then as needed.  DO NOT EXCEED 4,000MG OF ACETAMINOPHEN PER DAY.  3. If you still have pain despite the ibuprofen/tylenol combination, then take the oxycodone as prescribed.  Take colace as prescribed while taking narcotic (oxycodone) pain medication to prevent constipation.  4. Complete your 3 day course of antibiotic

## 2025-01-09 NOTE — ASU DISCHARGE PLAN (ADULT/PEDIATRIC) - CARE PROVIDER_API CALL
Karin Moya [facility]  Urology  [address] [place] [id number]  Phone: [phone]  Fax: [phone]  Follow Up Time:

## 2025-01-09 NOTE — H&P PST ADULT - BIRTH SEX
Follow-up with your doctor for further blood pressure management.  Take your blood pressure approximate 3 times a week to get a new baseline.  Follow-up for cardiac stress testing with Dr. Goodwin's clinic.  Please call Monday for appointment times.  Return with any new or worsening symptoms or any concerns.  
Male

## 2025-01-09 NOTE — H&P PST ADULT - HISTORY OF PRESENT ILLNESS
24 year old male PMH Vitamin D Insufficiency,  H/O Hearing Loss ; wears bilateral hearing aids, H/O Kidney Stones ( no prior surgical intervention), H/O Left Clavicle Fracture (2021 followed by surgery); now with Calculus Of Kidney presents today for PST prior to Cystoscopy-Laser Lithotripsy - LEFT ureteral Stent Placement with Dr De León on 1/9/2025.     Pt notes on 12/3/24 he went to the emergency room at [facility] with complaints of left low back ( flank)  pain and difficulty urinating.  Pt notes he underwent CT scan which showed left kidney stone (8mm). Denies prior H/O kidney stones in the past.  Denies any fevers or chills, denies current visible hematuria, does note "occasional burning with urination." Pt notes he was discharged from the emergency room and instructed to follow up with Dr De León. Following consult, exam, review of DX testing and discussions  regarding treatment options pt is electing for scheduled procedure.

## 2025-01-09 NOTE — ASU DISCHARGE PLAN (ADULT/PEDIATRIC) - FINANCIAL ASSISTANCE
[facility] provides services at a reduced cost to those who are determined to be eligible through [facility]’s financial assistance program. Information regarding [facility]’s financial assistance program can be found by going to [email or web address] or by calling [phone].

## 2025-01-09 NOTE — H&P PST ADULT - ASSESSMENT
24 year old male PMH Vitamin D Insufficiency,  H/O Hearing Loss ; wears bilateral hearing aids, H/O kidney Stone ( no surgical intervention), H/O Left Clavicle Fracture (2021 followed by surgery); now with Calculus Of Kidney presents today for PST prior to Cystoscopy-Laser Lithotripsy - LEFT ureteral Stent Placement with Dr De León on 1/9/2025.

## 2025-01-09 NOTE — BRIEF OPERATIVE NOTE - NSICDXBRIEFPOSTOP_GEN_ALL_CORE_FT
POST-OP DIAGNOSIS:  Obstruction of left ureteropelvic junction (UPJ) due to stone 09-Jan-2025 12:07:32  Edenilson Chavarria  Ureteral stricture, left 09-Jan-2025 12:07:44  Edenilson Chavarria  Urethral meatal stenosis 09-Jan-2025 12:07:58  Edenilson Chavarria

## 2025-01-09 NOTE — BRIEF OPERATIVE NOTE - NSICDXBRIEFPROCEDURE_GEN_ALL_CORE_FT
PROCEDURES:  Ureteroscopy, with ureteral stricture dilation 09-Jan-2025 12:06:31  Edenilson Chavarria  Cystoscopic insertion of ureteral stent 09-Jan-2025 12:06:46  Edenilson Chavarria  Dilation, urethral stricture, by meatal sound, male, initial 09-Jan-2025 12:06:57  Edenilson Chavarria

## 2025-01-09 NOTE — ASU DISCHARGE PLAN (ADULT/PEDIATRIC) - PAIN MANAGEMENT
Next dose of tylenol may be taken at 5PM/Prescriptions electronically submitted to pharmacy from [facility]

## 2025-01-09 NOTE — BRIEF OPERATIVE NOTE - OPERATION/FINDINGS
left upj stone and hydronephrosis on retrograde pyelogram  left ureteral distal stricture  urethral meatal stenosis

## 2025-01-10 PROBLEM — Z86.69 PERSONAL HISTORY OF OTHER DISEASES OF THE NERVOUS SYSTEM AND SENSE ORGANS: Chronic | Status: ACTIVE | Noted: 2025-01-02

## 2025-01-10 PROBLEM — S42.002A FRACTURE OF UNSPECIFIED PART OF LEFT CLAVICLE, INITIAL ENCOUNTER FOR CLOSED FRACTURE: Chronic | Status: ACTIVE | Noted: 2025-01-02

## 2025-01-10 PROBLEM — N20.0 CALCULUS OF KIDNEY: Chronic | Status: ACTIVE | Noted: 2025-01-02

## 2025-01-10 PROBLEM — Z92.89 PERSONAL HISTORY OF OTHER MEDICAL TREATMENT: Chronic | Status: ACTIVE | Noted: 2025-01-02

## 2025-01-13 ENCOUNTER — EMERGENCY (EMERGENCY)
Facility: HOSPITAL | Age: 25
LOS: 1 days | Discharge: HOME | End: 2025-01-13
Attending: EMERGENCY MEDICINE | Admitting: EMERGENCY MEDICINE
Payer: COMMERCIAL

## 2025-01-13 VITALS
HEART RATE: 107 BPM | TEMPERATURE: 98 F | RESPIRATION RATE: 18 BRPM | WEIGHT: 190.04 LBS | SYSTOLIC BLOOD PRESSURE: 163 MMHG | OXYGEN SATURATION: 97 % | HEIGHT: 71 IN | DIASTOLIC BLOOD PRESSURE: 100 MMHG

## 2025-01-13 VITALS
HEART RATE: 93 BPM | TEMPERATURE: 98 F | RESPIRATION RATE: 20 BRPM | OXYGEN SATURATION: 98 % | DIASTOLIC BLOOD PRESSURE: 67 MMHG | SYSTOLIC BLOOD PRESSURE: 159 MMHG

## 2025-01-13 DIAGNOSIS — Z87.81 PERSONAL HISTORY OF (HEALED) TRAUMATIC FRACTURE: Chronic | ICD-10-CM

## 2025-01-13 DIAGNOSIS — R31.9 HEMATURIA, UNSPECIFIED: ICD-10-CM

## 2025-01-13 PROBLEM — E55.9 VITAMIN D DEFICIENCY, UNSPECIFIED: Chronic | Status: ACTIVE | Noted: 2025-01-02

## 2025-01-13 PROBLEM — N20.0 CALCULUS OF KIDNEY: Chronic | Status: ACTIVE | Noted: 2025-01-02

## 2025-01-13 LAB
ALBUMIN SERPL ELPH-MCNC: 4.5 G/DL — SIGNIFICANT CHANGE UP (ref 3.3–5)
ALP SERPL-CCNC: 55 U/L — SIGNIFICANT CHANGE UP (ref 40–120)
ALT FLD-CCNC: 42 U/L — SIGNIFICANT CHANGE UP (ref 12–78)
ANION GAP SERPL CALC-SCNC: 8 MMOL/L — SIGNIFICANT CHANGE UP (ref 5–17)
APPEARANCE UR: ABNORMAL
APTT BLD: 36.1 SEC — HIGH (ref 24.5–35.6)
AST SERPL-CCNC: 19 U/L — SIGNIFICANT CHANGE UP (ref 15–37)
BASOPHILS # BLD AUTO: 0.05 K/UL — SIGNIFICANT CHANGE UP (ref 0–0.2)
BASOPHILS NFR BLD AUTO: 0.7 % — SIGNIFICANT CHANGE UP (ref 0–2)
BILIRUB SERPL-MCNC: 0.4 MG/DL — SIGNIFICANT CHANGE UP (ref 0.2–1.2)
BILIRUB UR-MCNC: SIGNIFICANT CHANGE UP
BUN SERPL-MCNC: 10 MG/DL — SIGNIFICANT CHANGE UP (ref 7–23)
CALCIUM SERPL-MCNC: 9.3 MG/DL — SIGNIFICANT CHANGE UP (ref 8.5–10.1)
CHLORIDE SERPL-SCNC: 106 MMOL/L — SIGNIFICANT CHANGE UP (ref 96–108)
CO2 SERPL-SCNC: 24 MMOL/L — SIGNIFICANT CHANGE UP (ref 22–31)
COLOR SPEC: ABNORMAL
CREAT SERPL-MCNC: 0.87 MG/DL — SIGNIFICANT CHANGE UP (ref 0.5–1.3)
DIFF PNL FLD: SIGNIFICANT CHANGE UP
EGFR: 124 ML/MIN/1.73M2 — SIGNIFICANT CHANGE UP
EOSINOPHIL # BLD AUTO: 0.17 K/UL — SIGNIFICANT CHANGE UP (ref 0–0.5)
EOSINOPHIL NFR BLD AUTO: 2.4 % — SIGNIFICANT CHANGE UP (ref 0–6)
GLUCOSE SERPL-MCNC: 106 MG/DL — HIGH (ref 70–99)
GLUCOSE UR QL: SIGNIFICANT CHANGE UP MG/DL
HCT VFR BLD CALC: 41.2 % — SIGNIFICANT CHANGE UP (ref 39–50)
HGB BLD-MCNC: 14 G/DL — SIGNIFICANT CHANGE UP (ref 13–17)
IMM GRANULOCYTES NFR BLD AUTO: 0.4 % — SIGNIFICANT CHANGE UP (ref 0–0.9)
INR BLD: 0.9 RATIO — SIGNIFICANT CHANGE UP (ref 0.85–1.16)
KETONES UR-MCNC: SIGNIFICANT CHANGE UP MG/DL
LEUKOCYTE ESTERASE UR-ACNC: SIGNIFICANT CHANGE UP
LYMPHOCYTES # BLD AUTO: 1.83 K/UL — SIGNIFICANT CHANGE UP (ref 1–3.3)
LYMPHOCYTES # BLD AUTO: 25.9 % — SIGNIFICANT CHANGE UP (ref 13–44)
MCHC RBC-ENTMCNC: 28.3 PG — SIGNIFICANT CHANGE UP (ref 27–34)
MCHC RBC-ENTMCNC: 34 G/DL — SIGNIFICANT CHANGE UP (ref 32–36)
MCV RBC AUTO: 83.4 FL — SIGNIFICANT CHANGE UP (ref 80–100)
MONOCYTES # BLD AUTO: 0.46 K/UL — SIGNIFICANT CHANGE UP (ref 0–0.9)
MONOCYTES NFR BLD AUTO: 6.5 % — SIGNIFICANT CHANGE UP (ref 2–14)
NEUTROPHILS # BLD AUTO: 4.52 K/UL — SIGNIFICANT CHANGE UP (ref 1.8–7.4)
NEUTROPHILS NFR BLD AUTO: 64.1 % — SIGNIFICANT CHANGE UP (ref 43–77)
NITRITE UR-MCNC: SIGNIFICANT CHANGE UP
NRBC # BLD: 0 /100 WBCS — SIGNIFICANT CHANGE UP (ref 0–0)
PH UR: SIGNIFICANT CHANGE UP (ref 5–8)
PLATELET # BLD AUTO: 228 K/UL — SIGNIFICANT CHANGE UP (ref 150–400)
POTASSIUM SERPL-MCNC: 3.7 MMOL/L — SIGNIFICANT CHANGE UP (ref 3.5–5.3)
POTASSIUM SERPL-SCNC: 3.7 MMOL/L — SIGNIFICANT CHANGE UP (ref 3.5–5.3)
PROT SERPL-MCNC: 7.8 G/DL — SIGNIFICANT CHANGE UP (ref 6–8.3)
PROT UR-MCNC: SIGNIFICANT CHANGE UP MG/DL
PROTHROM AB SERPL-ACNC: 10.5 SEC — SIGNIFICANT CHANGE UP (ref 9.9–13.4)
RBC # BLD: 4.94 M/UL — SIGNIFICANT CHANGE UP (ref 4.2–5.8)
RBC # FLD: 12 % — SIGNIFICANT CHANGE UP (ref 10.3–14.5)
SODIUM SERPL-SCNC: 138 MMOL/L — SIGNIFICANT CHANGE UP (ref 135–145)
SP GR SPEC: SIGNIFICANT CHANGE UP (ref 1–1.03)
UROBILINOGEN FLD QL: SIGNIFICANT CHANGE UP MG/DL (ref 0.2–1)
WBC # BLD: 7.06 K/UL — SIGNIFICANT CHANGE UP (ref 3.8–10.5)
WBC # FLD AUTO: 7.06 K/UL — SIGNIFICANT CHANGE UP (ref 3.8–10.5)

## 2025-01-13 PROCEDURE — 96361 HYDRATE IV INFUSION ADD-ON: CPT | Mod: XU

## 2025-01-13 PROCEDURE — 85730 THROMBOPLASTIN TIME PARTIAL: CPT

## 2025-01-13 PROCEDURE — 81001 URINALYSIS AUTO W/SCOPE: CPT

## 2025-01-13 PROCEDURE — 80053 COMPREHEN METABOLIC PANEL: CPT

## 2025-01-13 PROCEDURE — 51702 INSERT TEMP BLADDER CATH: CPT

## 2025-01-13 PROCEDURE — 85025 COMPLETE CBC W/AUTO DIFF WBC: CPT

## 2025-01-13 PROCEDURE — 36415 COLL VENOUS BLD VENIPUNCTURE: CPT

## 2025-01-13 PROCEDURE — 86900 BLOOD TYPING SEROLOGIC ABO: CPT

## 2025-01-13 PROCEDURE — 99285 EMERGENCY DEPT VISIT HI MDM: CPT

## 2025-01-13 PROCEDURE — 86901 BLOOD TYPING SEROLOGIC RH(D): CPT

## 2025-01-13 PROCEDURE — 86850 RBC ANTIBODY SCREEN: CPT

## 2025-01-13 PROCEDURE — 96374 THER/PROPH/DIAG INJ IV PUSH: CPT | Mod: XU

## 2025-01-13 PROCEDURE — 85610 PROTHROMBIN TIME: CPT

## 2025-01-13 PROCEDURE — 99284 EMERGENCY DEPT VISIT MOD MDM: CPT | Mod: 25

## 2025-01-13 RX ORDER — SODIUM CHLORIDE 9 MG/ML
1000 INJECTION, SOLUTION INTRAMUSCULAR; INTRAVENOUS; SUBCUTANEOUS ONCE
Refills: 0 | Status: COMPLETED | OUTPATIENT
Start: 2025-01-13 | End: 2025-01-13

## 2025-01-13 RX ORDER — LORAZEPAM 1 MG/1
0.5 TABLET ORAL ONCE
Refills: 0 | Status: DISCONTINUED | OUTPATIENT
Start: 2025-01-13 | End: 2025-01-13

## 2025-01-13 RX ORDER — LIDOCAINE HYDROCHLORIDE 10 MG/ML
10 INJECTION INFILTRATION; PERINEURAL ONCE
Refills: 0 | Status: COMPLETED | OUTPATIENT
Start: 2025-01-13 | End: 2025-01-13

## 2025-01-13 RX ADMIN — SODIUM CHLORIDE 1000 MILLILITER(S): 9 INJECTION, SOLUTION INTRAMUSCULAR; INTRAVENOUS; SUBCUTANEOUS at 11:00

## 2025-01-13 RX ADMIN — LIDOCAINE HYDROCHLORIDE 10 MILLILITER(S): 10 INJECTION INFILTRATION; PERINEURAL at 09:05

## 2025-01-13 RX ADMIN — LORAZEPAM 0.5 MILLIGRAM(S): 1 TABLET ORAL at 09:04

## 2025-01-13 RX ADMIN — SODIUM CHLORIDE 1000 MILLILITER(S): 9 INJECTION, SOLUTION INTRAMUSCULAR; INTRAVENOUS; SUBCUTANEOUS at 09:04

## 2025-01-13 NOTE — ED PROVIDER NOTE - CARE PROVIDER_API CALL
Edenilson Gomez  Urology  [address] [place] [id number]  Phone: [phone]  Fax: [phone]  Follow Up Time:

## 2025-01-13 NOTE — CONSULT NOTE ADULT - PROBLEM SELECTOR RECOMMENDATION 9
- 18fr lawler catheter placed w/ return of dark hematuria, no clots expressed on manual irrigation.  900cc of hematuria noted in lawler bag 2 hours following lawler placement.    - At this time, as lawler draining appropriately without clots no need to initiate CBI  - Lawler catheter to be removed, attempt ToV.  If no clots patient may be discharged from ED from Urologic perspective  - Patient instructed to follow up w/ Dr. De León in office tomorrow 1/14.    - Urologic return precautions stated to patient   - Above discussed w/ Dr. De León

## 2025-01-13 NOTE — ED PROVIDER NOTE - PATIENT PORTAL LINK FT
You can access the FollowMyHealth Patient Portal offered by [facility] by registering at the following website: [email or web address]. By joining [employer]’s FollowMyHealth portal, you will also be able to view your health information using other applications (apps) compatible with our system.

## 2025-01-13 NOTE — CONSULT NOTE ADULT - ASSESSMENT
24 year old male who is s/p cystoscopy, ureteroscopy, left ureteral stent placement, and meatal stricture dilation on 1/9/25 with Dr. De León presenting to [place] ED with hematuria.  VSS, afebrile.  Labs unremarkable, no leukocytosis or elevated Cr noted.

## 2025-01-13 NOTE — ED PROVIDER NOTE - PHYSICAL EXAMINATION
Gen: No acute distress, non toxic  Head: NCAT  Eyes: pink conjunctivae, EOMI, PERRL  GI: Abdomen soft, NT, ND. No rebound, no guarding  : No CVAT  Neuro: A&O x 3,  sensorimotor intact without deficits   MSK: Full ROM ext x 4  Skin: No rashes. intact and perfused.

## 2025-01-13 NOTE — ED ADULT TRIAGE NOTE - CHIEF COMPLAINT QUOTE
patient ambulatory to triage with c/o hematuria with clots since 11 pm. had uretal stent placed Thursday

## 2025-01-13 NOTE — CONSULT NOTE ADULT - SUBJECTIVE AND OBJECTIVE BOX
UROLOGY PA CONSULT NOTE:    CHIEF COMPLAINT:  Patient is a 24y old  Male who presents with a chief complaint of hematuria.     HPI:  Patient is a 24 year old male who is s/p cystoscopy, ureteroscopy, left ureteral stent placement, and meatal stricture dilation on 1/9/25 with Dr. De León presenting to [place] ED with hematuria.  Patient states he noted some hematuria following procedure last thursday and into Friday.  He noted hematuria to subside on Saturday and voided without difficulty.  Patient notes last night aroud 2300 he began voiding hematuric again, with clots.  This has occurred throughout the night.  He drank multiple glasses of water and noted persistent hematuria.  He awoke around 0700 this AM and noted to be passing clots.  He presents to the ED for further evaluation.  While in ED, he attempted to urinate however was unable to void and notes he feels bladder is distended.  He Denies fevers, chills, chest pain, SOB, palpitations, calf pain.      PAST MEDICAL HISTORY:  PAST MEDICAL & SURGICAL HISTORY:  Calculus of kidney      Vitamin D insufficiency      Fracture of left clavicle      H/O hearing loss      History of use of hearing aid in both ears      Kidney stone      H/O clavicle fracture          PAST SURGICAL HISTORY:    REVIEW OF SYSTEMS:  General/Constitutional: No acute distress, no headache, weakness, fevers, or chills   HEENT: Denies auditory or visual changes/disturbances, no vertigo, no throat pain, no dysphagia    Neck: Denies neck pain/stiffness, denies swelling/lumps/hoarseness   Lymphatic: Denies lumps or swelling in the axillae, groin, or neck bilaterally   Respiratory: Denies cough/hemoptysis, denies wheezing/SOB/dyspnea  Cardiac: Denies chest pain, palpitations  Abdomen: Denies abdominal bloating/fullness, nausea or vomiting, denies abdominal pain  Extremities: Denies sores, swelling, discoloration bilat UE/LE  Genitourinary: hematuria w/ clots  Neuro: Denies weakness, paraesthesias, paralysis, syncope, loss of vision  Skin: Denies pruritus, pain, rashes  Psych: Denies hallucinations, visual disturbances, or depression    MEDICATIONS:  Home Medications:  tamsulosin 0.4 mg oral capsule: 1 cap(s) orally once a day (in the morning) (09 Jan 2025 08:38)    MEDICATIONS  (STANDING):  sodium chloride 0.9% Bolus 1000 milliLiter(s) IV Bolus once    MEDICATIONS  (PRN):      ALLERGIES:  Allergies    No Known Allergies    Intolerances        SOCIAL HISTORY:  Social History:    Smoking: Yes [ ]  No [x ]   ______pk yrs  ETOH  Yes [ ]  No [x ]  Social [ ]  DRUGS:  Yes [ ]  No [ x]  if so what______________    FAMILY HISTORY:  FAMILY HISTORY:  No pertinent family history in first degree relatives        VITAL SIGNS:  Vital Signs Last 24 Hrs  T(C): 36.4 (13 Jan 2025 07:33), Max: 36.4 (13 Jan 2025 07:33)  T(F): 97.6 (13 Jan 2025 07:33), Max: 97.6 (13 Jan 2025 07:33)  HR: 107 (13 Jan 2025 07:33) (107 - 107)  BP: 163/100 (13 Jan 2025 07:33) (163/100 - 163/100)  BP(mean): --  RR: 18 (13 Jan 2025 07:33) (18 - 18)  SpO2: 97% (13 Jan 2025 07:33) (97% - 97%)    Parameters below as of 13 Jan 2025 07:33  Patient On (Oxygen Delivery Method): room air        PHYSICAL EXAM:  General: No acute distress,  Head, Eyes, Ears, Nose, Throat: Normal cephalic/atraumatic, anicteric, conjunctiva-non injected and moist, vision grossly intact, hearing grossly intact, no nasal discharge, ears and nose symmetrical and atraumatic.  Nasal, oral, and oropharyngeal mucosa pink moist with no evidence of ulceration  Neck: Supple, carotids have good upstroke, trachea in the midline, without JVD or thyromegaly  Lymphatic: No evidence of masses or lymphadenopathy in the head, neck, trunk, axillary, inguinal, or supraclavicular regions  Chest: Lungs are clear to P&A, no wheezing, no rales, no ronchi, with good inspiratory effort  Heart: Heart rhythm regular, no murmurs  Abdomen: Soft, non tender, good bowel sounds present in all four quadrants.  No guarding, rebound, and no peritoneal signs.  +suprapubic tenderness.    Extremity: No swelling, or open sores, no gross deformities,  good range of motion, 2+ peripheral pulses bilat UE/LE, no edema,  negative Aramis's sign, no lymphadenopathy  Neuro: Alert and oriented x3, motor and sensory intact  Psychiatric: Awake , alert, oriented x3 with an appropriate affect.       LABS:                    RADIOLOGY & ADDITIONAL STUDIES:    ASSESSMENT:  24 year old male who is s/p cystoscopy, ureteroscopy, left ureteral stent placement, and meatal stricture dilation on 1/9/25 with Dr. De León presenting to [place] ED with hematuria.      PLAN:  - 18fr lawler catheter placed w/ return of dark hematuria, no clots expressed on manual irrigation  - ?CBI  - to discuss w/ Dr. De León  UROLOGY PA CONSULT NOTE:    CHIEF COMPLAINT:  Patient is a 24y old  Male who presents with a chief complaint of hematuria.     HPI:  Patient is a 24 year old male who is s/p cystoscopy, ureteroscopy, left ureteral stent placement, and meatal stricture dilation on 1/9/25 with Dr. De León presenting to [place] ED with hematuria.  Patient states he noted some hematuria following procedure last thursday and into Friday.  He noted hematuria to subside on Saturday and voided without difficulty.  Patient notes last night aroud 2300 he began voiding hematuric again, with clots.  This has occurred throughout the night.  He drank multiple glasses of water and noted persistent hematuria.  He awoke around 0700 this AM and noted to be passing clots.  He presents to the ED for further evaluation.  While in ED, he attempted to urinate however was unable to void and notes he feels bladder is distended.  He Denies fevers, chills, chest pain, SOB, palpitations, calf pain.      PAST MEDICAL HISTORY:  PAST MEDICAL & SURGICAL HISTORY:  Calculus of kidney      Vitamin D insufficiency      Fracture of left clavicle      H/O hearing loss      History of use of hearing aid in both ears      Kidney stone      H/O clavicle fracture          PAST SURGICAL HISTORY:    REVIEW OF SYSTEMS:  General/Constitutional: No acute distress, no headache, weakness, fevers, or chills   HEENT: Denies auditory or visual changes/disturbances, no vertigo, no throat pain, no dysphagia    Neck: Denies neck pain/stiffness, denies swelling/lumps/hoarseness   Lymphatic: Denies lumps or swelling in the axillae, groin, or neck bilaterally   Respiratory: Denies cough/hemoptysis, denies wheezing/SOB/dyspnea  Cardiac: Denies chest pain, palpitations  Abdomen: Denies abdominal bloating/fullness, nausea or vomiting, denies abdominal pain  Extremities: Denies sores, swelling, discoloration bilat UE/LE  Genitourinary: hematuria w/ clots  Neuro: Denies weakness, paraesthesias, paralysis, syncope, loss of vision  Skin: Denies pruritus, pain, rashes  Psych: Denies hallucinations, visual disturbances, or depression    MEDICATIONS:  Home Medications:  tamsulosin 0.4 mg oral capsule: 1 cap(s) orally once a day (in the morning) (09 Jan 2025 08:38)    MEDICATIONS  (STANDING):  sodium chloride 0.9% Bolus 1000 milliLiter(s) IV Bolus once    MEDICATIONS  (PRN):      ALLERGIES:  Allergies    No Known Allergies    Intolerances        SOCIAL HISTORY:  Social History:    Smoking: Yes [ ]  No [x ]   ______pk yrs  ETOH  Yes [ ]  No [x ]  Social [ ]  DRUGS:  Yes [ ]  No [ x]  if so what______________    FAMILY HISTORY:  FAMILY HISTORY:  No pertinent family history in first degree relatives        VITAL SIGNS:  Vital Signs Last 24 Hrs  T(C): 36.4 (13 Jan 2025 07:33), Max: 36.4 (13 Jan 2025 07:33)  T(F): 97.6 (13 Jan 2025 07:33), Max: 97.6 (13 Jan 2025 07:33)  HR: 107 (13 Jan 2025 07:33) (107 - 107)  BP: 163/100 (13 Jan 2025 07:33) (163/100 - 163/100)  BP(mean): --  RR: 18 (13 Jan 2025 07:33) (18 - 18)  SpO2: 97% (13 Jan 2025 07:33) (97% - 97%)    Parameters below as of 13 Jan 2025 07:33  Patient On (Oxygen Delivery Method): room air        PHYSICAL EXAM:  General: No acute distress,  Head, Eyes, Ears, Nose, Throat: Normal cephalic/atraumatic, anicteric, conjunctiva-non injected and moist, vision grossly intact, hearing grossly intact, no nasal discharge, ears and nose symmetrical and atraumatic.  Nasal, oral, and oropharyngeal mucosa pink moist with no evidence of ulceration  Neck: Supple, carotids have good upstroke, trachea in the midline, without JVD or thyromegaly  Lymphatic: No evidence of masses or lymphadenopathy in the head, neck, trunk, axillary, inguinal, or supraclavicular regions  Chest: Lungs are clear to P&A, no wheezing, no rales, no ronchi, with good inspiratory effort  Heart: Heart rhythm regular, no murmurs  Abdomen: Soft, non tender, good bowel sounds present in all four quadrants.  No guarding, rebound, and no peritoneal signs.  +suprapubic tenderness.    Extremity: No swelling, or open sores, no gross deformities,  good range of motion, 2+ peripheral pulses bilat UE/LE, no edema,  negative Aramis's sign, no lymphadenopathy  Neuro: Alert and oriented x3, motor and sensory intact  Psychiatric: Awake , alert, oriented x3 with an appropriate affect.       LABS:                            14.0   7.06  )-----------( 228      ( 13 Jan 2025 10:15 )             41.2   01-13    138  |  106  |  10  ----------------------------<  106[H]  3.7   |  24  |  0.87    Ca    9.3      13 Jan 2025 10:15    TPro  7.8  /  Alb  4.5  /  TBili  0.4  /  DBili  x   /  AST  19  /  ALT  42  /  AlkPhos  55  01-13        Urine Microscopic-Add On (NC) (01.13.25 @ 09:38)    Red Blood Cell - Urine: Too Numerous to count /HPF   White Blood Cell - Urine: 1 /HPF

## 2025-01-13 NOTE — ED PROVIDER NOTE - ATTENDING APP SHARED VISIT CONTRIBUTION OF CARE
Examination reveals a well-developed well-nourished white male in no acute distress at the present time with a benign soft nontender abdomen and a Casas catheter that is in place draining light claudia colored urine.  I agree with plan and management outlined by PA.

## 2025-01-13 NOTE — ED ADULT NURSE NOTE - NSFALLUNIVINTERV_ED_ALL_ED
Bed/Stretcher in lowest position, wheels locked, appropriate side rails in place/Call bell, personal items and telephone in reach/Instruct patient to call for assistance before getting out of bed/chair/stretcher/Non-slip footwear applied when patient is off stretcher/[place] to call system/Physically safe environment - no spills, clutter or unnecessary equipment/Purposeful proactive rounding/Room/bathroom lighting operational, light cord in reach

## 2025-01-13 NOTE — ED PROVIDER NOTE - PROGRESS NOTE DETAILS
900cc hematuria in lawler bag after ~2 hours. lawler draining without further clots. Lawler removed by urology PA. Pt urinated following lawler removal, passed 1 small clot followed by hematuria. spoke with urology PA, recommending discharge with outpt f/u in Dr. De León's office tomorrow. Labs unremarkable. results and plan d/w pt and family at bedside. provided copy of all results. return precautions discussed.

## 2025-01-13 NOTE — PROCEDURE NOTE - NSINFORMCONSENT_GEN_A_CORE
Benefits, risks, and possible complications of procedure explained to patient/caregiver who verbalized understanding and gave verbal consent. <-- Click to add NO pertinent Family History

## 2025-01-13 NOTE — ED PROVIDER NOTE - DIFFERENTIAL DIAGNOSIS
Differential Diagnosis Differential diagnosis includes urinary retention hematuria doubt ureteral colic

## 2025-01-13 NOTE — ED PROVIDER NOTE - CLINICAL SUMMARY MEDICAL DECISION MAKING FREE TEXT BOX
Patient is a 24-year-old white male status post left ureteral stent by Dr. Rider on 1/9/2024 presenting now with hematuria and passage of clots.  Patient states that he has had intermittent hematuria since the procedure last night more clots were passed from 11 PM on and then approximately 7:00 he was only able to pass a small blood clot but no urine in the presents with urge to urinate.  This case will require thorough evaluation as well as labs and consultation with urology neurology PA.

## 2025-01-13 NOTE — ED PROVIDER NOTE - OBJECTIVE STATEMENT
24-year-old male PMHx kidney stone s/p left ureteral stent by Dr. De León 1/9 presents to ED complaining of hematuria with passage of clots.  Reports he has had intermittent hematuria since procedure but last night began noticing more significant passage of clots from 11 PM through this morning.  States he tried to urinate around 7 AM was only able to pass a small blood clot but no urine.  Urology PA already at bedside.  Patient otherwise feeling well.  Denies fever, chills, dysuria abdominal pain, flank/back pain, testicular pain.

## 2025-01-13 NOTE — ED ADULT NURSE NOTE - OBJECTIVE STATEMENT
Pt ambulatory to ED c/o hematuria. Pt states he had a kidney stone and had a stent placed on Thursday. Pt states since last night he has been noticing blood in his urine and finds it difficult to urinate. Pt states he needs to urinate but is unable urinate while here in the ED. 245mL noted in bladder. Pt denies any chest pain, SOB, fever, chills, but states he is nauseous and has been vomiting this morning. Surgical Team at bedside. Pt remains in stretcher with mother at bedside.

## 2025-01-13 NOTE — ED PROVIDER NOTE - NSDCPRINTRESULTS_ED_ALL_ED
Addended by: NELDA DOSHI on: 1/9/2025 01:08 PM     Modules accepted: Orders    
Patient requests all Lab, Cardiology, and Radiology Results on their Discharge Instructions

## 2025-01-13 NOTE — ED PROVIDER NOTE - NSFOLLOWUPINSTRUCTIONS_ED_ALL_ED_FT
- Return to the ED for any new or worsening symptoms including but not limited to worsening pain, inability to urinate, passage of large clots, abdominal distension, etc  - Follow-up with Dr. De León in office tomorrow 1/14

## 2025-01-14 ENCOUNTER — APPOINTMENT (OUTPATIENT)
Dept: UROLOGY | Facility: CLINIC | Age: 25
End: 2025-01-14

## 2025-01-14 ENCOUNTER — APPOINTMENT (OUTPATIENT)
Dept: UROLOGY | Facility: CLINIC | Age: 25
End: 2025-01-14
Payer: COMMERCIAL

## 2025-01-14 VITALS
HEIGHT: 71 IN | HEART RATE: 80 BPM | BODY MASS INDEX: 27.3 KG/M2 | DIASTOLIC BLOOD PRESSURE: 80 MMHG | TEMPERATURE: 97.5 F | WEIGHT: 195 LBS | SYSTOLIC BLOOD PRESSURE: 125 MMHG | OXYGEN SATURATION: 99 %

## 2025-01-14 DIAGNOSIS — R31.0 GROSS HEMATURIA: ICD-10-CM

## 2025-01-14 DIAGNOSIS — N20.0 CALCULUS OF KIDNEY: ICD-10-CM

## 2025-01-14 LAB
BILIRUB UR QL STRIP: ABNORMAL
CLARITY UR: CLEAR
COLLECTION METHOD: NORMAL
GLUCOSE UR-MCNC: NEGATIVE
HCG UR QL: 1 EU/DL
HGB UR QL STRIP.AUTO: ABNORMAL
KETONES UR-MCNC: NORMAL
LEUKOCYTE ESTERASE UR QL STRIP: ABNORMAL
NITRITE UR QL STRIP: POSITIVE
PH UR STRIP: 5.5
PROT UR STRIP-MCNC: NORMAL
SP GR UR STRIP: 1.02

## 2025-01-14 PROCEDURE — 52310 CYSTOSCOPY AND TREATMENT: CPT

## 2025-01-14 PROCEDURE — 99214 OFFICE O/P EST MOD 30 MIN: CPT | Mod: 25

## 2025-01-14 RX ORDER — CIPROFLOXACIN HYDROCHLORIDE 500 MG/1
500 TABLET, FILM COATED ORAL
Qty: 10 | Refills: 0 | Status: ACTIVE | COMMUNITY
Start: 2025-01-14 | End: 1900-01-01

## 2025-01-16 LAB — BACTERIA UR CULT: NORMAL

## 2025-01-21 LAB
KIDNEY STONE SOURCE: NORMAL
NIDUS STONE QN: NORMAL

## 2025-02-01 ENCOUNTER — OUTPATIENT (OUTPATIENT)
Dept: OUTPATIENT SERVICES | Facility: HOSPITAL | Age: 25
LOS: 1 days | End: 2025-02-01
Payer: COMMERCIAL

## 2025-02-01 ENCOUNTER — APPOINTMENT (OUTPATIENT)
Dept: ULTRASOUND IMAGING | Facility: CLINIC | Age: 25
End: 2025-02-01
Payer: COMMERCIAL

## 2025-02-01 DIAGNOSIS — Z87.81 PERSONAL HISTORY OF (HEALED) TRAUMATIC FRACTURE: Chronic | ICD-10-CM

## 2025-02-01 DIAGNOSIS — N20.0 CALCULUS OF KIDNEY: ICD-10-CM

## 2025-02-01 PROCEDURE — 76770 US EXAM ABDO BACK WALL COMP: CPT | Mod: 26

## 2025-02-01 PROCEDURE — 76770 US EXAM ABDO BACK WALL COMP: CPT

## 2025-02-18 ENCOUNTER — APPOINTMENT (OUTPATIENT)
Dept: INTERNAL MEDICINE | Facility: CLINIC | Age: 25
End: 2025-02-18
Payer: COMMERCIAL

## 2025-02-18 ENCOUNTER — NON-APPOINTMENT (OUTPATIENT)
Age: 25
End: 2025-02-18

## 2025-02-18 VITALS
OXYGEN SATURATION: 98 % | RESPIRATION RATE: 16 BRPM | HEIGHT: 71 IN | TEMPERATURE: 98.5 F | DIASTOLIC BLOOD PRESSURE: 76 MMHG | HEART RATE: 60 BPM | SYSTOLIC BLOOD PRESSURE: 120 MMHG | WEIGHT: 190 LBS | BODY MASS INDEX: 26.6 KG/M2

## 2025-02-18 DIAGNOSIS — Z00.00 ENCOUNTER FOR GENERAL ADULT MEDICAL EXAMINATION W/OUT ABNORMAL FINDINGS: ICD-10-CM

## 2025-02-18 DIAGNOSIS — R07.89 OTHER CHEST PAIN: ICD-10-CM

## 2025-02-18 DIAGNOSIS — Z78.9 OTHER SPECIFIED HEALTH STATUS: ICD-10-CM

## 2025-02-18 PROCEDURE — 99202 OFFICE O/P NEW SF 15 MIN: CPT | Mod: 25

## 2025-02-18 PROCEDURE — 93000 ELECTROCARDIOGRAM COMPLETE: CPT

## 2025-02-18 PROCEDURE — 99385 PREV VISIT NEW AGE 18-39: CPT

## 2025-05-16 ENCOUNTER — NON-APPOINTMENT (OUTPATIENT)
Age: 25
End: 2025-05-16

## 2025-05-16 ENCOUNTER — APPOINTMENT (OUTPATIENT)
Dept: CARDIOLOGY | Facility: CLINIC | Age: 25
End: 2025-05-16

## 2025-06-23 ENCOUNTER — APPOINTMENT (OUTPATIENT)
Dept: CARDIOLOGY | Facility: CLINIC | Age: 25
End: 2025-06-23
Payer: COMMERCIAL

## 2025-06-23 VITALS
SYSTOLIC BLOOD PRESSURE: 151 MMHG | DIASTOLIC BLOOD PRESSURE: 90 MMHG | HEIGHT: 71 IN | BODY MASS INDEX: 27.16 KG/M2 | HEART RATE: 66 BPM | WEIGHT: 194 LBS | OXYGEN SATURATION: 99 %

## 2025-06-23 PROCEDURE — 93000 ELECTROCARDIOGRAM COMPLETE: CPT

## 2025-06-23 PROCEDURE — G2211 COMPLEX E/M VISIT ADD ON: CPT | Mod: NC

## 2025-06-23 PROCEDURE — 99204 OFFICE O/P NEW MOD 45 MIN: CPT

## 2025-06-26 ENCOUNTER — APPOINTMENT (OUTPATIENT)
Dept: NEPHROLOGY | Facility: CLINIC | Age: 25
End: 2025-06-26
Payer: COMMERCIAL

## 2025-06-26 ENCOUNTER — NON-APPOINTMENT (OUTPATIENT)
Age: 25
End: 2025-06-26

## 2025-06-26 VITALS
TEMPERATURE: 97.3 F | DIASTOLIC BLOOD PRESSURE: 89 MMHG | WEIGHT: 182 LBS | HEIGHT: 71 IN | OXYGEN SATURATION: 95 % | HEART RATE: 71 BPM | BODY MASS INDEX: 25.48 KG/M2 | SYSTOLIC BLOOD PRESSURE: 162 MMHG

## 2025-06-26 VITALS
WEIGHT: 188 LBS | HEIGHT: 71 IN | DIASTOLIC BLOOD PRESSURE: 91 MMHG | OXYGEN SATURATION: 99 % | SYSTOLIC BLOOD PRESSURE: 146 MMHG | HEART RATE: 89 BPM | BODY MASS INDEX: 26.32 KG/M2 | TEMPERATURE: 97.6 F

## 2025-06-26 PROBLEM — R03.0 ELEVATED BLOOD PRESSURE, SITUATIONAL: Status: ACTIVE | Noted: 2025-06-23

## 2025-06-26 PROBLEM — R82.998 FOAMY URINE: Status: ACTIVE | Noted: 2025-06-26

## 2025-06-26 PROCEDURE — 99205 OFFICE O/P NEW HI 60 MIN: CPT

## 2025-06-26 PROCEDURE — G2211 COMPLEX E/M VISIT ADD ON: CPT | Mod: NC

## 2025-06-30 LAB
APPEARANCE: CLEAR
BACTERIA: NEGATIVE /HPF
BILIRUBIN URINE: NEGATIVE
BLOOD URINE: NEGATIVE
CAST: 2 /LPF
COLOR: YELLOW
CREAT SPEC-SCNC: 175 MG/DL
CREAT/PROT UR: 0.1 RATIO
EPITHELIAL CELLS: 0 /HPF
GLUCOSE QUALITATIVE U: NEGATIVE MG/DL
KETONES URINE: NEGATIVE MG/DL
LEUKOCYTE ESTERASE URINE: NEGATIVE
MICROSCOPIC-UA: NORMAL
NITRITE URINE: NEGATIVE
PH URINE: 7
PROT UR-MCNC: 16 MG/DL
PROTEIN URINE: NORMAL MG/DL
RED BLOOD CELLS URINE: 1 /HPF
SPECIFIC GRAVITY URINE: 1.02
UROBILINOGEN URINE: 0.2 MG/DL
WHITE BLOOD CELLS URINE: 2 /HPF

## 2025-07-15 ENCOUNTER — APPOINTMENT (OUTPATIENT)
Dept: UROLOGY | Facility: CLINIC | Age: 25
End: 2025-07-15

## 2025-07-25 ENCOUNTER — APPOINTMENT (OUTPATIENT)
Dept: CARDIOLOGY | Facility: CLINIC | Age: 25
End: 2025-07-25
Payer: COMMERCIAL

## 2025-07-25 PROCEDURE — 93306 TTE W/DOPPLER COMPLETE: CPT

## (undated) DEVICE — SOL IRR BAG NS 0.9% 3000ML

## (undated) DEVICE — ONCORE 26 INSUFLATION DEVICE

## (undated) DEVICE — PRESSURE INFUSOR BAG 1000ML

## (undated) DEVICE — SEAL BIOPSY PORT ADJUSTABLE UP TO 9F

## (undated) DEVICE — SOL IRR BAG H2O 3000ML

## (undated) DEVICE — UROVAC

## (undated) DEVICE — DRAPE DRAINAGE BAG URO CATCH II

## (undated) DEVICE — GLV 7 PROTEXIS (WHITE)

## (undated) DEVICE — FOLEY TRAY 14FR 5CC LTX UMETER CLOSED

## (undated) DEVICE — CLAMP BX URETERSP FLEX 1MM 15CM

## (undated) DEVICE — PLV-SCD MACHINE: Type: DURABLE MEDICAL EQUIPMENT

## (undated) DEVICE — DRAPE C ARM UNIVERSAL

## (undated) DEVICE — IRR BULB PATHFINDER + 10"

## (undated) DEVICE — SOL INJ NS 0.9% 1000ML

## (undated) DEVICE — SOL IRR POUR H2O 1000ML

## (undated) DEVICE — GOWN LG

## (undated) DEVICE — DRAPE SNAP KOVER 36X28

## (undated) DEVICE — SYR LUER LOK 10CC

## (undated) DEVICE — WARMING BLANKET UPPER ADULT

## (undated) DEVICE — FLOORMAT SURGISAFE ABSORBANT WHITE 36" X 72"

## (undated) DEVICE — PACK CYSTO

## (undated) DEVICE — VENODYNE/SCD SLEEVE CALF MEDIUM

## (undated) DEVICE — VENODYNE/SCD SLEEVE CALF LARGE

## (undated) DEVICE — DRAPE TOWEL BLUE 17" X 24"